# Patient Record
Sex: MALE | Race: WHITE | NOT HISPANIC OR LATINO
[De-identification: names, ages, dates, MRNs, and addresses within clinical notes are randomized per-mention and may not be internally consistent; named-entity substitution may affect disease eponyms.]

---

## 2024-08-20 ENCOUNTER — NON-APPOINTMENT (OUTPATIENT)
Age: 74
End: 2024-08-20

## 2024-08-28 PROBLEM — Z00.00 ENCOUNTER FOR PREVENTIVE HEALTH EXAMINATION: Status: ACTIVE | Noted: 2024-08-28

## 2024-09-04 DIAGNOSIS — M54.50 LOW BACK PAIN, UNSPECIFIED: ICD-10-CM

## 2024-09-04 DIAGNOSIS — G89.29 LOW BACK PAIN, UNSPECIFIED: ICD-10-CM

## 2024-09-05 ENCOUNTER — APPOINTMENT (OUTPATIENT)
Dept: ORTHOPEDIC SURGERY | Facility: CLINIC | Age: 74
End: 2024-09-05
Payer: MEDICARE

## 2024-09-05 VITALS
BODY MASS INDEX: 23.9 KG/M2 | OXYGEN SATURATION: 98 % | TEMPERATURE: 98.4 F | WEIGHT: 140 LBS | HEIGHT: 64 IN | HEART RATE: 88 BPM | DIASTOLIC BLOOD PRESSURE: 79 MMHG | SYSTOLIC BLOOD PRESSURE: 126 MMHG

## 2024-09-05 DIAGNOSIS — M48.061 SPINAL STENOSIS, LUMBAR REGION WITHOUT NEUROGENIC CLAUDICATION: ICD-10-CM

## 2024-09-05 PROCEDURE — 99204 OFFICE O/P NEW MOD 45 MIN: CPT

## 2024-09-05 PROCEDURE — 72100 X-RAY EXAM L-S SPINE 2/3 VWS: CPT

## 2024-09-06 PROBLEM — M48.061 LUMBAR SPINAL STENOSIS: Status: ACTIVE | Noted: 2024-09-06

## 2024-09-06 PROBLEM — M48.061 SPINAL STENOSIS OF LUMBAR REGION WITHOUT NEUROGENIC CLAUDICATION: Status: ACTIVE | Noted: 2024-09-06

## 2024-09-12 NOTE — PHYSICAL EXAM
[de-identified] : NVI, except diminished sensation L lateral calf.  No point tenderness.   [de-identified] : Lumbar xray with flex/ex 9/5/2024:  no osseous abnormality noted, lumbar spondylosis, spondy L4-5  MRI L spine 8/14/2024 (orthopacs):  moderate to severe spinal stenosis L3-4, severe spinal stenosis L4-5.

## 2024-09-12 NOTE — HISTORY OF PRESENT ILLNESS
[de-identified] : Pt presents with complaints of low back pain.  He moved homes and was lifting heavy boxes recently.  In the morning, he feels bent over.  Walking alleviates it.  Walks 20-30k steps regularly.  Complains of annoying numbness in left leg- posterior thigh and posterolateral calf.   Has dx of polymyalgia.  Has not tried steroid injections.  He is active and finds his symptoms affect his lifestyle.

## 2024-09-12 NOTE — HISTORY OF PRESENT ILLNESS
[de-identified] : Pt presents with complaints of low back pain.  He moved homes and was lifting heavy boxes recently.  In the morning, he feels bent over.  Walking alleviates it.  Walks 20-30k steps regularly.  Complains of annoying numbness in left leg- posterior thigh and posterolateral calf.   Has dx of polymyalgia.  Has not tried steroid injections.  He is active and finds his symptoms affect his lifestyle.

## 2024-09-12 NOTE — DISCUSSION/SUMMARY
[Surgical risks reviewed] : Surgical risks reviewed [de-identified] : A lengthy discussion was held with the patient regarding his condition.  We discussed nonoperative treatment strategies including physical therapy, pharmacologic management and steroid injections.  We discussed surgical options and the attendant benefits, alternatives, and risks, including but not limited to infection, bleeding, persistent pain, persistent neurologic deficit, neurologic injury, adjacent segment degeneration, and the need for future surgery.  The patient's questions were sought and answered satisfactorily. The patient will consider steroid injection vs surgery for L3-4, L4-5 spinal stenosis.   He will call the office when he decides.  IMila NP am acting as a scribe for Dr. Frank Schwab.

## 2024-09-12 NOTE — DISCUSSION/SUMMARY
[Surgical risks reviewed] : Surgical risks reviewed [de-identified] : A lengthy discussion was held with the patient regarding his condition.  We discussed nonoperative treatment strategies including physical therapy, pharmacologic management and steroid injections.  We discussed surgical options and the attendant benefits, alternatives, and risks, including but not limited to infection, bleeding, persistent pain, persistent neurologic deficit, neurologic injury, adjacent segment degeneration, and the need for future surgery.  The patient's questions were sought and answered satisfactorily. The patient will consider steroid injection vs surgery for L3-4, L4-5 spinal stenosis.   He will call the office when he decides.  IMila NP am acting as a scribe for Dr. Frank Schwab.

## 2024-09-12 NOTE — PHYSICAL EXAM
[de-identified] : NVI, except diminished sensation L lateral calf.  No point tenderness.   [de-identified] : Lumbar xray with flex/ex 9/5/2024:  no osseous abnormality noted, lumbar spondylosis, spondy L4-5  MRI L spine 8/14/2024 (orthopacs):  moderate to severe spinal stenosis L3-4, severe spinal stenosis L4-5.

## 2024-09-12 NOTE — DISCUSSION/SUMMARY
[Surgical risks reviewed] : Surgical risks reviewed [de-identified] : A lengthy discussion was held with the patient regarding his condition.  We discussed nonoperative treatment strategies including physical therapy, pharmacologic management and steroid injections.  We discussed surgical options and the attendant benefits, alternatives, and risks, including but not limited to infection, bleeding, persistent pain, persistent neurologic deficit, neurologic injury, adjacent segment degeneration, and the need for future surgery.  The patient's questions were sought and answered satisfactorily. The patient will consider steroid injection vs surgery for L3-4, L4-5 spinal stenosis.   He will call the office when he decides.  IMila NP am acting as a scribe for Dr. Frank Schwab.

## 2024-09-12 NOTE — HISTORY OF PRESENT ILLNESS
[de-identified] : Pt presents with complaints of low back pain.  He moved homes and was lifting heavy boxes recently.  In the morning, he feels bent over.  Walking alleviates it.  Walks 20-30k steps regularly.  Complains of annoying numbness in left leg- posterior thigh and posterolateral calf.   Has dx of polymyalgia.  Has not tried steroid injections.  He is active and finds his symptoms affect his lifestyle.

## 2024-09-12 NOTE — DISCUSSION/SUMMARY
[Surgical risks reviewed] : Surgical risks reviewed [de-identified] : A lengthy discussion was held with the patient regarding his condition.  We discussed nonoperative treatment strategies including physical therapy, pharmacologic management and steroid injections.  We discussed surgical options and the attendant benefits, alternatives, and risks, including but not limited to infection, bleeding, persistent pain, persistent neurologic deficit, neurologic injury, adjacent segment degeneration, and the need for future surgery.  The patient's questions were sought and answered satisfactorily. The patient will consider steroid injection vs surgery for L3-4, L4-5 spinal stenosis.   He will call the office when he decides.  IMila NP am acting as a scribe for Dr. Frank Schwab.

## 2024-09-12 NOTE — PHYSICAL EXAM
[de-identified] : NVI, except diminished sensation L lateral calf.  No point tenderness.   [de-identified] : Lumbar xray with flex/ex 9/5/2024:  no osseous abnormality noted, lumbar spondylosis, spondy L4-5  MRI L spine 8/14/2024 (orthopacs):  moderate to severe spinal stenosis L3-4, severe spinal stenosis L4-5.

## 2024-09-12 NOTE — PHYSICAL EXAM
[de-identified] : NVI, except diminished sensation L lateral calf.  No point tenderness.   [de-identified] : Lumbar xray with flex/ex 9/5/2024:  no osseous abnormality noted, lumbar spondylosis, spondy L4-5  MRI L spine 8/14/2024 (orthopacs):  moderate to severe spinal stenosis L3-4, severe spinal stenosis L4-5. No

## 2024-09-12 NOTE — HISTORY OF PRESENT ILLNESS
[de-identified] : Pt presents with complaints of low back pain.  He moved homes and was lifting heavy boxes recently.  In the morning, he feels bent over.  Walking alleviates it.  Walks 20-30k steps regularly.  Complains of annoying numbness in left leg- posterior thigh and posterolateral calf.   Has dx of polymyalgia.  Has not tried steroid injections.  He is active and finds his symptoms affect his lifestyle.

## 2024-09-16 ENCOUNTER — NON-APPOINTMENT (OUTPATIENT)
Age: 74
End: 2024-09-16

## 2024-10-21 VITALS
RESPIRATION RATE: 16 BRPM | WEIGHT: 145.51 LBS | DIASTOLIC BLOOD PRESSURE: 71 MMHG | HEIGHT: 61 IN | TEMPERATURE: 97 F | SYSTOLIC BLOOD PRESSURE: 138 MMHG | HEART RATE: 60 BPM | OXYGEN SATURATION: 98 %

## 2024-10-21 RX ORDER — CLONAZEPAM 1 MG
1 TABLET ORAL
Refills: 0 | DISCHARGE

## 2024-10-21 RX ORDER — CHLORHEXIDINE GLUCONATE ORAL RINSE 1.2 MG/ML
1 SOLUTION DENTAL EVERY 12 HOURS
Refills: 0 | Status: DISCONTINUED | OUTPATIENT
Start: 2024-10-22 | End: 2024-10-22

## 2024-10-21 RX ORDER — POVIDONE-IODINE 10 %
1 SOLUTION, NON-ORAL TOPICAL ONCE
Refills: 0 | Status: COMPLETED | OUTPATIENT
Start: 2024-10-22 | End: 2024-10-22

## 2024-10-21 NOTE — H&P ADULT - NSHPLABSRESULTS_GEN_ALL_CORE
Preop CBC, BMP, PT/INR, PTT within normal range and reviewed per medical clearance  H/H: 14.7/ 45.9  Cr: 0.91  UA: WNL  Preop CXR within normal limits and reviewed per medical clearance   Preop EKG 64bpm NSR within normal limits and reviewed per medical clearance  3M: DOS  T + S: DOS

## 2024-10-21 NOTE — ASU PATIENT PROFILE, ADULT - REASON FOR ADMISSION, PROFILE
minimally invasive decompressin , laminectomy L3-4 and L4-5 minimally invasive decompression , laminectomy L3-4 and L4-5

## 2024-10-21 NOTE — H&P ADULT - PROBLEM SELECTOR PLAN 1
Admit to Orthopedic Service for elective MIS decompression and laminectomy L3-5    Medically cleared and optimized for surgery by Dr. Dwyer

## 2024-10-21 NOTE — H&P ADULT - HISTORY OF PRESENT ILLNESS
73M with complaint of lower back pain x    Presents today for a minimally invasive decompression, laminectomy L3-4 and L4-5 with Dr. Schwab and Dr. Greene.

## 2024-10-22 ENCOUNTER — TRANSCRIPTION ENCOUNTER (OUTPATIENT)
Age: 74
End: 2024-10-22

## 2024-10-22 ENCOUNTER — APPOINTMENT (OUTPATIENT)
Dept: ORTHOPEDIC SURGERY | Facility: HOSPITAL | Age: 74
End: 2024-10-22

## 2024-10-22 ENCOUNTER — OUTPATIENT (OUTPATIENT)
Dept: OUTPATIENT SERVICES | Facility: HOSPITAL | Age: 74
LOS: 1 days | Discharge: HOME | End: 2024-10-22
Payer: COMMERCIAL

## 2024-10-22 VITALS
OXYGEN SATURATION: 98 % | RESPIRATION RATE: 19 BRPM | DIASTOLIC BLOOD PRESSURE: 83 MMHG | SYSTOLIC BLOOD PRESSURE: 158 MMHG | HEART RATE: 52 BPM

## 2024-10-22 DIAGNOSIS — M48.061 SPINAL STENOSIS, LUMBAR REGION WITHOUT NEUROGENIC CLAUDICATION: ICD-10-CM

## 2024-10-22 DIAGNOSIS — Z98.890 OTHER SPECIFIED POSTPROCEDURAL STATES: Chronic | ICD-10-CM

## 2024-10-22 LAB
BLD GP AB SCN SERPL QL: NEGATIVE — SIGNIFICANT CHANGE UP
RH IG SCN BLD-IMP: POSITIVE — SIGNIFICANT CHANGE UP

## 2024-10-22 PROCEDURE — 63048 LAM FACETEC &FORAMOT EA ADDL: CPT

## 2024-10-22 PROCEDURE — 76000 FLUOROSCOPY <1 HR PHYS/QHP: CPT

## 2024-10-22 PROCEDURE — 86850 RBC ANTIBODY SCREEN: CPT

## 2024-10-22 PROCEDURE — 63047 LAM FACETEC & FORAMOT LUMBAR: CPT

## 2024-10-22 PROCEDURE — 86901 BLOOD TYPING SEROLOGIC RH(D): CPT

## 2024-10-22 PROCEDURE — 86900 BLOOD TYPING SEROLOGIC ABO: CPT

## 2024-10-22 PROCEDURE — 63035 LAMOT DCMPRN NRV RT EA ADDL: CPT | Mod: 50

## 2024-10-22 PROCEDURE — C1889: CPT

## 2024-10-22 PROCEDURE — 63030 LAMOT DCMPRN NRV RT 1 LMBR: CPT | Mod: 50

## 2024-10-22 DEVICE — SURGIFOAM PAD 8CM X 12.5CM X 10MM (100): Type: IMPLANTABLE DEVICE | Status: FUNCTIONAL

## 2024-10-22 DEVICE — SURGIFLO HEMOSTATIC MATRIX KIT: Type: IMPLANTABLE DEVICE | Status: FUNCTIONAL

## 2024-10-22 RX ORDER — HYDROMORPHONE HYDROCHLORIDE 1 MG/ML
0.5 INJECTION, SOLUTION INTRAMUSCULAR; INTRAVENOUS; SUBCUTANEOUS
Refills: 0 | Status: DISCONTINUED | OUTPATIENT
Start: 2024-10-22 | End: 2024-10-22

## 2024-10-22 RX ORDER — APREPITANT 125MG-80MG
40 KIT ORAL ONCE
Refills: 0 | Status: COMPLETED | OUTPATIENT
Start: 2024-10-22 | End: 2024-10-22

## 2024-10-22 RX ORDER — TAMSULOSIN HCL 0.4 MG
1 CAPSULE ORAL
Refills: 0 | DISCHARGE

## 2024-10-22 RX ORDER — ACETAMINOPHEN 325 MG
1000 TABLET ORAL ONCE
Refills: 0 | Status: COMPLETED | OUTPATIENT
Start: 2024-10-22 | End: 2024-10-22

## 2024-10-22 RX ORDER — OXYCODONE HYDROCHLORIDE 30 MG/1
5 TABLET, FILM COATED, EXTENDED RELEASE ORAL EVERY 4 HOURS
Refills: 0 | Status: DISCONTINUED | OUTPATIENT
Start: 2024-10-22 | End: 2024-10-22

## 2024-10-22 RX ORDER — OXYCODONE AND ACETAMINOPHEN 5; 325 MG/1; MG/1
1 TABLET ORAL
Qty: 12 | Refills: 0
Start: 2024-10-22 | End: 2024-10-23

## 2024-10-22 RX ORDER — CLONAZEPAM 1 MG
1 TABLET ORAL
Refills: 0 | DISCHARGE

## 2024-10-22 RX ORDER — ATORVASTATIN CALCIUM 10 MG/1
1 TABLET, FILM COATED ORAL
Refills: 0 | DISCHARGE

## 2024-10-22 RX ORDER — ACETAMINOPHEN 325 MG
1000 TABLET ORAL ONCE
Refills: 0 | Status: DISCONTINUED | OUTPATIENT
Start: 2024-10-22 | End: 2024-10-22

## 2024-10-22 RX ADMIN — Medication 1000 MILLIGRAM(S): at 06:39

## 2024-10-22 RX ADMIN — APREPITANT 40 MILLIGRAM(S): KIT at 06:39

## 2024-10-22 RX ADMIN — Medication 1 APPLICATION(S): at 06:54

## 2024-10-22 RX ADMIN — CHLORHEXIDINE GLUCONATE ORAL RINSE 1 APPLICATION(S): 1.2 SOLUTION DENTAL at 06:54

## 2024-10-22 NOTE — DISCHARGE NOTE NURSING/CASE MANAGEMENT/SOCIAL WORK - PATIENT PORTAL LINK FT
You can access the FollowMyHealth Patient Portal offered by [facility] by registering at the following website: [email or web address]. By joining [facility]’s FollowMyHealth portal, you will also be able to view your health information using other applications (apps) compatible with our system.

## 2024-10-22 NOTE — ASU DISCHARGE PLAN (ADULT/PEDIATRIC) - ASU DC SPECIAL INSTRUCTIONSFT
Follow up with Dr. Schwab in 1-2 weeks. Call the office at [phone] to schedule your appointment.    WOUND CARE: You may shower; soap and water over incision sites. Do not scrub. Pat dry when done. Allow for tape dressing to fall off on its own.    ACTIVITY: Ambulate as tolerated, but no heavy lifting (>10lbs) or strenuous exercise. You may resume regular diet.    NEW MEDICATIONS:  Percocet 5mg-325mg - 1 tab every 4-6 hours as needed for severe pain  Robaxin 500mg - 1 tab every 8 hours as needed for muscle spasm    Call the office if you experience increasing pain, redness, swelling or drainage from incision sites/wounds, or temperature >101.4F.

## 2024-10-22 NOTE — DISCHARGE NOTE NURSING/CASE MANAGEMENT/SOCIAL WORK - FINANCIAL ASSISTANCE
[facility] provides services at a reduced cost to those who are determined to be eligible through [facility]’s financial assistance program. Information regarding [facility]’s financial assistance program can be found by going to [email or web address] or by calling [phone].

## 2024-10-22 NOTE — ASU DISCHARGE PLAN (ADULT/PEDIATRIC) - FINANCIAL ASSISTANCE
[facility] provides services at a reduced cost to those who are determined to be eligible through [facility]’s financial assistance program. Information regarding [facility]’s financial assistance program can be found by going to [email or web address] or by calling [phone].

## 2024-10-22 NOTE — ASU DISCHARGE PLAN (ADULT/PEDIATRIC) - CARE PROVIDER_API CALL
Schwab, Frank Johann  Orthopaedic Surgery  [address]  [place] [address]  Phone: [phone]  Fax: [phone]  Follow Up Time:

## 2024-10-28 PROBLEM — E78.5 HYPERLIPIDEMIA, UNSPECIFIED: Chronic | Status: ACTIVE | Noted: 2024-10-21

## 2024-10-28 PROBLEM — Z87.39 PERSONAL HISTORY OF OTHER DISEASES OF THE MUSCULOSKELETAL SYSTEM AND CONNECTIVE TISSUE: Chronic | Status: ACTIVE | Noted: 2024-10-21

## 2024-10-28 PROBLEM — Z86.19 PERSONAL HISTORY OF OTHER INFECTIOUS AND PARASITIC DISEASES: Chronic | Status: ACTIVE | Noted: 2024-10-21

## 2024-10-31 ENCOUNTER — NON-APPOINTMENT (OUTPATIENT)
Age: 74
End: 2024-10-31

## 2024-10-31 ENCOUNTER — APPOINTMENT (OUTPATIENT)
Dept: ORTHOPEDIC SURGERY | Facility: CLINIC | Age: 74
End: 2024-10-31
Payer: MEDICARE

## 2024-10-31 VITALS
TEMPERATURE: 98.2 F | BODY MASS INDEX: 23.9 KG/M2 | DIASTOLIC BLOOD PRESSURE: 75 MMHG | HEIGHT: 64 IN | WEIGHT: 140 LBS | HEART RATE: 76 BPM | OXYGEN SATURATION: 98 % | SYSTOLIC BLOOD PRESSURE: 122 MMHG

## 2024-10-31 DIAGNOSIS — Z98.890 OTHER SPECIFIED POSTPROCEDURAL STATES: ICD-10-CM

## 2024-10-31 PROCEDURE — 99024 POSTOP FOLLOW-UP VISIT: CPT

## 2024-11-01 ENCOUNTER — NON-APPOINTMENT (OUTPATIENT)
Age: 74
End: 2024-11-01

## 2024-12-11 ENCOUNTER — APPOINTMENT (OUTPATIENT)
Dept: ORTHOPEDIC SURGERY | Age: 74
End: 2024-12-11

## 2024-12-18 ENCOUNTER — APPOINTMENT (OUTPATIENT)
Dept: ORTHOPEDIC SURGERY | Facility: CLINIC | Age: 74
End: 2024-12-18
Payer: MEDICARE

## 2024-12-18 VITALS
DIASTOLIC BLOOD PRESSURE: 80 MMHG | WEIGHT: 140 LBS | BODY MASS INDEX: 23.9 KG/M2 | TEMPERATURE: 97 F | HEART RATE: 70 BPM | SYSTOLIC BLOOD PRESSURE: 142 MMHG | HEIGHT: 64 IN | OXYGEN SATURATION: 98 %

## 2024-12-18 DIAGNOSIS — Z98.890 OTHER SPECIFIED POSTPROCEDURAL STATES: ICD-10-CM

## 2024-12-18 PROCEDURE — 99024 POSTOP FOLLOW-UP VISIT: CPT

## (undated) DEVICE — PACK SPINE

## (undated) DEVICE — DRAPE FOR LEICA MICROSCOPE

## (undated) DEVICE — STAPLER SKIN PROXIMATE

## (undated) DEVICE — SUT VICRYL 2-0 27" SH UNDYED

## (undated) DEVICE — DRAPE FOR 2 TIER TABLE W/ 8" BACK 72" LONG

## (undated) DEVICE — DRAPE BACK TABLE COVER 80X90"

## (undated) DEVICE — DRAPE ISOLATION W INCISE FILM & POUCH

## (undated) DEVICE — GLV 8 PROTEXIS (WHITE)

## (undated) DEVICE — GLV 8.5 PROTEXIS ORTHO (CREAM)

## (undated) DEVICE — BUR STRYKER MULTI FLUTE ROUND 5MM

## (undated) DEVICE — SUT VICRYL 2-0 27" CT-1 UNDYED

## (undated) DEVICE — DRAPE 1/2 SHEET 40X57"

## (undated) DEVICE — Device

## (undated) DEVICE — VENODYNE/SCD SLEEVE CALF MEDIUM

## (undated) DEVICE — PREP DURAPREP 26CC

## (undated) DEVICE — NDL SPINAL 18G X 3.5" (PINK)

## (undated) DEVICE — SUT STRATAFIX SYMMETRIC PDS 1 45CM OS-6

## (undated) DEVICE — POSITIONER JACKSON TABLE XODUS

## (undated) DEVICE — WARMING BLANKET UPPER ADULT

## (undated) DEVICE — WOUND IRR SURGIPHOR

## (undated) DEVICE — DRAPE 3/4 SHEET 52X76"

## (undated) DEVICE — DRILL BIT STRYKER PRECISION NEURO 3X3.8MM

## (undated) DEVICE — DRAPE INSTRUMENT POUCH 6.75" X 11"